# Patient Record
Sex: FEMALE | Race: WHITE | Employment: STUDENT | URBAN - METROPOLITAN AREA
[De-identification: names, ages, dates, MRNs, and addresses within clinical notes are randomized per-mention and may not be internally consistent; named-entity substitution may affect disease eponyms.]

---
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
## 2019-01-02 ENCOUNTER — NURSE TRIAGE (OUTPATIENT)
Dept: FAMILY MEDICINE CLINIC | Facility: CLINIC | Age: 19
End: 2019-01-02

## 2019-01-02 NOTE — TELEPHONE ENCOUNTER
Pt called refusing to go to ED for SOB. States she does not feel that she needs to go. Pt verbalized she is not SOB at present time. Advised pt to go to ED/UC to be evaluated for her symptoms. Pt refused. Education provided.   Offer appt for tomorrow pt agr

## 2019-01-02 NOTE — TELEPHONE ENCOUNTER
Action Requested: Summary for Provider     []  Critical Lab, Recommendations Needed  [] Need Additional Advice  []   FYI    []   Need Orders  [] Need Medications Sent to Pharmacy  []  Other     SUMMARY: Patient going to [facility] ED now.  Patient with compla

## 2019-01-03 ENCOUNTER — HOSPITAL ENCOUNTER (OUTPATIENT)
Dept: GENERAL RADIOLOGY | Age: 19
Discharge: HOME | End: 2019-01-03
Attending: FAMILY MEDICINE
Payer: COMMERCIAL

## 2019-01-03 ENCOUNTER — OFFICE VISIT (OUTPATIENT)
Dept: FAMILY MEDICINE CLINIC | Facility: CLINIC | Age: 19
End: 2019-01-03
Payer: COMMERCIAL

## 2019-01-03 ENCOUNTER — APPOINTMENT (OUTPATIENT)
Dept: LAB | Age: 19
End: 2019-01-03
Attending: FAMILY MEDICINE
Payer: COMMERCIAL

## 2019-01-03 VITALS
HEIGHT: 65 IN | SYSTOLIC BLOOD PRESSURE: 93 MMHG | DIASTOLIC BLOOD PRESSURE: 64 MMHG | BODY MASS INDEX: 23.32 KG/M2 | HEART RATE: 101 BPM | WEIGHT: 140 LBS | TEMPERATURE: 97 F

## 2019-01-03 DIAGNOSIS — J45.21 MILD INTERMITTENT ASTHMA WITH EXACERBATION: Primary | ICD-10-CM

## 2019-01-03 DIAGNOSIS — J30.1 ALLERGIC RHINITIS DUE TO POLLEN, UNSPECIFIED SEASONALITY: ICD-10-CM

## 2019-01-03 DIAGNOSIS — J45.21 MILD INTERMITTENT ASTHMA WITH EXACERBATION: ICD-10-CM

## 2019-01-03 DIAGNOSIS — R05.9 COUGH: ICD-10-CM

## 2019-01-03 DIAGNOSIS — R06.00 DOE (DYSPNEA ON EXERTION): ICD-10-CM

## 2019-01-03 PROCEDURE — 93010 ELECTROCARDIOGRAM REPORT: CPT | Performed by: FAMILY MEDICINE

## 2019-01-03 PROCEDURE — 99214 OFFICE O/P EST MOD 30 MIN: CPT | Performed by: FAMILY MEDICINE

## 2019-01-03 PROCEDURE — 93005 ELECTROCARDIOGRAM TRACING: CPT

## 2019-01-03 PROCEDURE — 99212 OFFICE O/P EST SF 10 MIN: CPT | Performed by: FAMILY MEDICINE

## 2019-01-03 PROCEDURE — 71046 X-RAY EXAM CHEST 2 VIEWS: CPT | Performed by: FAMILY MEDICINE

## 2019-01-03 RX ORDER — MONTELUKAST SODIUM 10 MG/1
10 TABLET ORAL NIGHTLY
Qty: 90 TABLET | Refills: 1 | Status: SHIPPED | OUTPATIENT
Start: 2019-01-03 | End: 2019-06-24

## 2019-01-03 RX ORDER — ALBUTEROL SULFATE 90 UG/1
AEROSOL, METERED RESPIRATORY (INHALATION)
Qty: 1 INHALER | Refills: 3 | Status: SHIPPED | OUTPATIENT
Start: 2019-01-03

## 2019-01-03 RX ORDER — FLUTICASONE PROPIONATE AND SALMETEROL 113; 14 UG/1; UG/1
1 POWDER, METERED RESPIRATORY (INHALATION) 2 TIMES DAILY
Qty: 1 EACH | Refills: 3 | Status: SHIPPED | OUTPATIENT
Start: 2019-01-03 | End: 2019-01-15

## 2019-01-03 RX ORDER — DEXAMETHASONE 4 MG/1
TABLET ORAL
Qty: 5 TABLET | Refills: 0 | Status: SHIPPED | OUTPATIENT
Start: 2019-01-03 | End: 2019-06-24

## 2019-01-03 NOTE — TELEPHONE ENCOUNTER
Very well but I could even work her in at 9:10 tomorrow morning or sometime before noon but it would have to be a focused visit. I am leaving early tomorrow as I have a meeting so I cannot have late added on appointments.

## 2019-01-03 NOTE — PROGRESS NOTES
Patient ID: Isael Bunch is a 22year old female. HPI  Patient presents with:  Asthma: Pt is c/o feeling tightness in chest and sob, unable to take a full deep breath    She is very active. She likes to run outside.   She states since October though sh CDC (Girls, 2-20 Years) data.     BP Readings from Last 6 Encounters:  01/03/19 : 93/64  07/16/18 : 102/56  04/25/17 : 116/69  04/14/17 : 105/64  03/04/16 : 91/59 (2 %, Z = -2.14 /  20 %, Z = -0.84)*  10/19/15 : 103/65 (23 %, Z = -0.74 /  44 %, Z = -0.16)* motion. No thyromegaly present. Cardiovascular: Normal rate, regular rhythm and normal heart sounds. Pulmonary/Chest: Effort normal and breath sounds normal. No respiratory distress. No cyanosis or tachypnea. She has no retractions.   Lymphadenopath would like an EKG and we will go ahead and order this for her now. Other orders  -     Albuterol Sulfate HFA (PROAIR HFA) 108 (90 Base) MCG/ACT Inhalation Aero Soln; 2 puffs 3-4 times daily as needed.         Referrals (if applicable)  No orders of the d

## 2019-01-03 NOTE — PATIENT INSTRUCTIONS
If you have your shot records at home or if the school has then please have them faxed to [phone] attention Dr. Yohana Trent.

## 2019-01-15 ENCOUNTER — TELEPHONE (OUTPATIENT)
Dept: INTERNAL MEDICINE CLINIC | Facility: CLINIC | Age: 19
End: 2019-01-15

## 2019-01-15 RX ORDER — BUDESONIDE AND FORMOTEROL FUMARATE DIHYDRATE 160; 4.5 UG/1; UG/1
2 AEROSOL RESPIRATORY (INHALATION) 2 TIMES DAILY
Qty: 1 INHALER | Refills: 3 | Status: SHIPPED | OUTPATIENT
Start: 2019-01-15 | End: 2020-01-15

## 2019-01-15 NOTE — TELEPHONE ENCOUNTER
Fluticasone-Salmeterol (AIRDUO RESPICLICK 932/70) 077-27 MCG/ACT Inhalation Aerosol Powder, Breath Activated Inhale 1 puff into the lungs 2 (two) times daily. Disp: 1 each Rfl: 3      Fax received from [employer] requesting drug change.     Message: Plan

## 2019-01-16 NOTE — TELEPHONE ENCOUNTER
Symbicort inhaler sent in instead. 2 puffs twice a day as the 7900 Fm 1826 respite click is not covered under your insurance plan at this time.

## 2019-01-26 NOTE — TELEPHONE ENCOUNTER
Pt scheduled appt through [address] with following sx:    1/7/2019     9:10 AM  10 mins.  Connor Miranda DO        [facility]      Patient Comments:   Shortness of breath     Please advise. Initial (On Arrival)

## 2019-06-24 ENCOUNTER — OFFICE VISIT (OUTPATIENT)
Dept: OBGYN CLINIC | Facility: CLINIC | Age: 19
End: 2019-06-24
Payer: COMMERCIAL

## 2019-06-24 VITALS
DIASTOLIC BLOOD PRESSURE: 72 MMHG | BODY MASS INDEX: 22.66 KG/M2 | HEIGHT: 65 IN | WEIGHT: 136 LBS | SYSTOLIC BLOOD PRESSURE: 108 MMHG

## 2019-06-24 DIAGNOSIS — Z11.3 ROUTINE SCREENING FOR STI (SEXUALLY TRANSMITTED INFECTION): Primary | ICD-10-CM

## 2019-06-24 DIAGNOSIS — Z01.419 ENCOUNTER FOR ANNUAL ROUTINE GYNECOLOGICAL EXAMINATION: ICD-10-CM

## 2019-06-24 DIAGNOSIS — Z30.41 ENCOUNTER FOR BIRTH CONTROL PILLS MAINTENANCE: ICD-10-CM

## 2019-06-24 PROCEDURE — 99385 PREV VISIT NEW AGE 18-39: CPT | Performed by: ADVANCED PRACTICE MIDWIFE

## 2019-06-24 RX ORDER — DROSPIRENONE AND ETHINYL ESTRADIOL 0.02-3(28)
1 KIT ORAL DAILY
COMMUNITY
End: 2019-06-24

## 2019-06-24 RX ORDER — DROSPIRENONE AND ETHINYL ESTRADIOL 0.02-3(28)
1 KIT ORAL DAILY
Qty: 3 PACKAGE | Refills: 3 | Status: SHIPPED | OUTPATIENT
Start: 2019-06-24

## 2019-06-24 NOTE — PROGRESS NOTES
HPI:    Patient ID: Marco Zapien is a 20year old female. Here for annual checkup and refill of birth control pills. Is unsure which pill she is currently on because she was changed recently and has noticed more acne since changing.         Review of Sy no nipple discharge, no skin change and no tenderness. Abdominal: Soft. She exhibits no distension and no mass. There is no tenderness. There is no rebound and no guarding.    Genitourinary: Rectum normal. There is no rash, tenderness or lesion on the rig HFA) 108 (90 Base) MCG/ACT Inhalation Aero Soln 2 puffs 3-4 times daily as needed. Disp: 1 Inhaler Rfl: 3   GIANVI 3-0.02 MG Oral Tab  Disp:  Rfl:       History reviewed. No pertinent past medical history. History reviewed. No pertinent surgical history.

## 2019-06-25 ENCOUNTER — TELEPHONE (OUTPATIENT)
Dept: OBGYN CLINIC | Facility: CLINIC | Age: 19
End: 2019-06-25

## 2019-06-25 NOTE — TELEPHONE ENCOUNTER
Giveo message sent    ----- Message from NILES Shaw sent at 6/25/2019  6:00 PM CDT -----  Negative Chlamydia/GC please notify patient

## 2019-08-14 ENCOUNTER — TELEPHONE (OUTPATIENT)
Dept: OBGYN CLINIC | Facility: CLINIC | Age: 19
End: 2019-08-14

## 2019-08-14 NOTE — TELEPHONE ENCOUNTER
Pt. requesting to change pharmacy to [facility] in [facility] [phone] on [facility] - Phone # [phone], for refill for her birth control. Pt. Requesting to get a refill sent. Pt states that she is completely out of her birth control.  Pt. States t

## 2019-08-15 NOTE — TELEPHONE ENCOUNTER
Per pt her insurance wont cover  [facility] [employer] anymore. Pt requesting transfer to new [facility] pharmacy provided. Pharmacy called for transfer. Detailed message left to call in ocp per pharmacy tech.

## 2020-09-11 RX ORDER — DROSPIRENONE AND ETHINYL ESTRADIOL 0.02-3(28)
KIT ORAL
Qty: 1 PACKAGE | Refills: 0 | Status: SHIPPED | OUTPATIENT
Start: 2020-09-11

## (undated) NOTE — Clinical Note
4/25/2017          To Whom It May Concern:    Dee Phalen is currently under my medical care. Please excuse the patient from gym class and softball until further notice as she has a concussion/ head injury.    Please be advised that the student may return

## (undated) NOTE — Clinical Note
[facility] [employer] of Child Health Examination       Student's Name  Sheryl Hummel Birth Date initials by date(s) and sign here.)   ALTERNATIVE PROOF OF IMMUNITY   1.Clinical diagnosis (measles, mumps, hepatits B) is allowed when verified by physician & supported with lab confirmation. Attach copy of lab result.        *MEASLES ([place])  MO/DA/YR Birth Defects? Developmental delay? Yes       No    Yes       No  Hospitalizations? When? What for? Yes       No    Blood disorders? Hemophilia, Sickle Cell, Other? Explain. Yes       No  Surgery? (List all.)  When? What for?    Yes       No ovarian syndrome, acanthosis nigricans)                           At Risk  NO   Lead Risk Questionnaire  Req'd for children 6 months thru 6 yrs enrolled in licensed or public school operated day care, ,  nursery school and/or  (blood t SPECIAL INSTRUCTIONS/DEVICES e.g. safety glasses, glass eye, chest protector for arrhythmia, pacemaker, prosthetic device, dental bridge, false teeth, athleticsupport/cup     None   MENTAL HEALTH/OTHER   Is there anything else the school should know about

## (undated) NOTE — LETTER
[facility] [employer] of Child Health Examination       Student's Name  Sheryl Hummel Birth Date Title                           Date    (If adding dates to the above immunization history section, put your initials by date(s) and sign here.)   ALTERNATIVE PROOF OF IMMUNITY   1.Clinical diagnosis (measles, mumps, hepatitis B) is allowed when verified Loss of function of one of paired organs? (eye/ear/kidney/testicle)   Yes   No      Birth Defects? Developmental delay? Yes   No    Yes   No  Hospitalizations? When? What for? Yes   No    Blood disorders? Hemophilia, Sickle Cell, Other? Explain. Lead Risk Questionnaire  Req'd for children 6 months thru 6 yrs enrolled in licensed or public school operated day care, ,  nursery school and/or  (blood test req’d if resides in [place] or high risk zip)   Questionnaire Administered: Yes protector for arrhythmia, pacemaker, prosthetic device, dental bridge, false teeth, athleticsupport/cup     None   MENTAL HEALTH/OTHER   Is there anything else the school should know about this student?   No  If you would like to discuss this student's heal

## (undated) NOTE — LETTER
ASTHMA ACTION PLAN for Padgett Kim     : 2/15/2000     Date: 19  Doctor:  Ileana Patel DO  Phone for doctor or clinic: [facility] [address], [address]  [address] [facility]  [phone]      ACT Score: 1 Breathing is hard and fast  Nose opens wide  Can't walk  Ribs show  Can't talk well Medicine How much to take When to take it    If your symptoms do not improve in ONE hour -  go to the emergency room or call 911 immediately!  If symptoms improve, call offi

## (undated) NOTE — MR AVS SNAPSHOT
[facility] [place] [address] [address] [id number] [phone]               Thank you for choosing us for your health care visit with Jazzy Lebron MD.  We are glad to serve you and happy to provide you with this summary of y No Known Allergies                Today's Vital Signs     BP Pulse    116/69 mmHg (65 %, Z = 0.39 / 60 %, Z = 0.26*) 89    Temp Height    97.9 °F (36.6 °C) (Oral) 5' 5\" (1.651 m) (62 %*, Z = 0.30)    Weight BMI    129 lb (58.514 kg) (59 %*, Z = 0.22) 21.

## (undated) NOTE — MR AVS SNAPSHOT
[address]  [address] [facility]  [phone]               Thank you for choosing us for your health care visit with Laquita Mantilla DO.   We are glad to serve you and happy to provide you with this summary your Zip Code and Date of Birth to complete the sign-up process. If you do not sign up before the expiration date, you must request a new code.     Your unique [employer] Access Code: [email or web address]  Expires: 6/13/2017  9:30 AM    If you have questions, you can c